# Patient Record
Sex: MALE | Race: WHITE | Employment: FULL TIME | URBAN - METROPOLITAN AREA
[De-identification: names, ages, dates, MRNs, and addresses within clinical notes are randomized per-mention and may not be internally consistent; named-entity substitution may affect disease eponyms.]

---

## 2021-05-31 ENCOUNTER — APPOINTMENT (OUTPATIENT)
Dept: GENERAL RADIOLOGY | Age: 46
End: 2021-05-31
Payer: COMMERCIAL

## 2021-05-31 ENCOUNTER — HOSPITAL ENCOUNTER (EMERGENCY)
Age: 46
Discharge: HOME | End: 2021-05-31
Attending: EMERGENCY MEDICINE
Payer: COMMERCIAL

## 2021-05-31 VITALS
SYSTOLIC BLOOD PRESSURE: 156 MMHG | OXYGEN SATURATION: 98 % | DIASTOLIC BLOOD PRESSURE: 99 MMHG | RESPIRATION RATE: 16 BRPM | HEART RATE: 85 BPM | BODY MASS INDEX: 32.91 KG/M2 | HEIGHT: 72 IN | TEMPERATURE: 97.1 F | WEIGHT: 243 LBS

## 2021-05-31 DIAGNOSIS — R07.9 CHEST PAIN, UNSPECIFIED TYPE: Primary | ICD-10-CM

## 2021-05-31 LAB
ALBUMIN SERPL-MCNC: 4.7 GM/DL (ref 3.4–5)
ALP BLD-CCNC: 50 IU/L (ref 40–129)
ALT SERPL-CCNC: 16 U/L (ref 10–40)
ANION GAP SERPL CALCULATED.3IONS-SCNC: 14 MMOL/L (ref 4–16)
AST SERPL-CCNC: 14 IU/L (ref 15–37)
BASOPHILS ABSOLUTE: 0 K/CU MM
BASOPHILS RELATIVE PERCENT: 0.4 % (ref 0–1)
BILIRUB SERPL-MCNC: 0.6 MG/DL (ref 0–1)
BUN BLDV-MCNC: 13 MG/DL (ref 6–23)
CALCIUM SERPL-MCNC: 10.3 MG/DL (ref 8.3–10.6)
CHLORIDE BLD-SCNC: 101 MMOL/L (ref 99–110)
CO2: 21 MMOL/L (ref 21–32)
CREAT SERPL-MCNC: 1 MG/DL (ref 0.9–1.3)
DIFFERENTIAL TYPE: ABNORMAL
EOSINOPHILS ABSOLUTE: 0 K/CU MM
EOSINOPHILS RELATIVE PERCENT: 0.3 % (ref 0–3)
GFR AFRICAN AMERICAN: >60 ML/MIN/1.73M2
GFR NON-AFRICAN AMERICAN: >60 ML/MIN/1.73M2
GLUCOSE BLD-MCNC: 93 MG/DL (ref 70–99)
HCT VFR BLD CALC: 48.3 % (ref 42–52)
HEMOGLOBIN: 16.8 GM/DL (ref 13.5–18)
IMMATURE NEUTROPHIL %: 0.2 % (ref 0–0.43)
LYMPHOCYTES ABSOLUTE: 2.6 K/CU MM
LYMPHOCYTES RELATIVE PERCENT: 26.3 % (ref 24–44)
MCH RBC QN AUTO: 30.2 PG (ref 27–31)
MCHC RBC AUTO-ENTMCNC: 34.8 % (ref 32–36)
MCV RBC AUTO: 86.9 FL (ref 78–100)
MONOCYTES ABSOLUTE: 0.7 K/CU MM
MONOCYTES RELATIVE PERCENT: 6.9 % (ref 0–4)
PDW BLD-RTO: 12.8 % (ref 11.7–14.9)
PLATELET # BLD: 264 K/CU MM (ref 140–440)
PMV BLD AUTO: 9.4 FL (ref 7.5–11.1)
POTASSIUM SERPL-SCNC: 3.9 MMOL/L (ref 3.5–5.1)
RBC # BLD: 5.56 M/CU MM (ref 4.6–6.2)
SEGMENTED NEUTROPHILS ABSOLUTE COUNT: 6.5 K/CU MM
SEGMENTED NEUTROPHILS RELATIVE PERCENT: 65.9 % (ref 36–66)
SODIUM BLD-SCNC: 136 MMOL/L (ref 135–145)
TOTAL IMMATURE NEUTOROPHIL: 0.02 K/CU MM
TOTAL PROTEIN: 7.8 GM/DL (ref 6.4–8.2)
TROPONIN T: <0.01 NG/ML
TROPONIN T: <0.01 NG/ML
WBC # BLD: 9.8 K/CU MM (ref 4–10.5)

## 2021-05-31 PROCEDURE — 99284 EMERGENCY DEPT VISIT MOD MDM: CPT

## 2021-05-31 PROCEDURE — 71045 X-RAY EXAM CHEST 1 VIEW: CPT

## 2021-05-31 PROCEDURE — 93005 ELECTROCARDIOGRAM TRACING: CPT | Performed by: EMERGENCY MEDICINE

## 2021-05-31 PROCEDURE — 85025 COMPLETE CBC W/AUTO DIFF WBC: CPT

## 2021-05-31 PROCEDURE — 80053 COMPREHEN METABOLIC PANEL: CPT

## 2021-05-31 PROCEDURE — 84484 ASSAY OF TROPONIN QUANT: CPT

## 2021-05-31 RX ORDER — FLUTICASONE PROPIONATE 50 MCG
1 SPRAY, SUSPENSION (ML) NASAL DAILY
COMMUNITY
End: 2021-06-02

## 2021-05-31 ASSESSMENT — PAIN DESCRIPTION - DESCRIPTORS: DESCRIPTORS: ACHING;DULL

## 2021-05-31 ASSESSMENT — PAIN DESCRIPTION - LOCATION: LOCATION: CHEST

## 2021-05-31 ASSESSMENT — PAIN DESCRIPTION - FREQUENCY: FREQUENCY: INTERMITTENT

## 2021-05-31 ASSESSMENT — PAIN SCALES - GENERAL: PAINLEVEL_OUTOF10: 2

## 2021-05-31 ASSESSMENT — HEART SCORE: ECG: 0

## 2021-05-31 NOTE — ED PROVIDER NOTES
Emergency Department Encounter  Location: [address] [place] [address]    Patient: Myrna Ulrich  MRN: [id number]  : 1975  Date of evaluation: 2021  ED Provider: Laya Enriquez MD    7PM  Myrna Ulrich was checked out to me by Dr. Mary Jane Taylor. Please see his/her initial documentation for details of the patient's initial ED presentation, physical exam and completed studies. In brief, Myrna Ulrcih is a 48 y.o. male that presented to the emergency department with chest pain. Initial evaluation is unremarkable. Heart score of 3. Awaiting delta troponin.     I have reviewed and interpreted all of the currently available lab results and diagnostics from this visit:  Results for orders placed or performed during the hospital encounter of 21   CBC Auto Differential   Result Value Ref Range    WBC 9.8 4.0 - 10.5 K/CU MM    RBC 5.56 4.6 - 6.2 M/CU MM    Hemoglobin 16.8 13.5 - 18.0 GM/DL    Hematocrit 48.3 42 - 52 %    MCV 86.9 78 - 100 FL    MCH 30.2 27 - 31 PG    MCHC 34.8 32.0 - 36.0 %    RDW 12.8 11.7 - 14.9 %    Platelets 999 849 - 388 K/CU MM    MPV 9.4 7.5 - 11.1 FL    Differential Type AUTOMATED DIFFERENTIAL     Segs Relative 65.9 36 - 66 %    Lymphocytes % 26.3 24 - 44 %    Monocytes % 6.9 (H) 0 - 4 %    Eosinophils % 0.3 0 - 3 %    Basophils % 0.4 0 - 1 %    Segs Absolute 6.5 K/CU MM    Lymphocytes Absolute 2.6 K/CU MM    Monocytes Absolute 0.7 K/CU MM    Eosinophils Absolute 0.0 K/CU MM    Basophils Absolute 0.0 K/CU MM    Immature Neutrophil % 0.2 0 - 0.43 %    Total Immature Neutrophil 0.02 K/CU MM   CMP   Result Value Ref Range    Sodium 136 135 - 145 MMOL/L    Potassium 3.9 3.5 - 5.1 MMOL/L    Chloride 101 99 - 110 mMol/L    CO2 21 21 - 32 MMOL/L    BUN 13 6 - 23 MG/DL    CREATININE 1.0 0.9 - 1.3 MG/DL    Glucose 93 70 - 99 MG/DL    Calcium 10.3 8.3 - 10.6 MG/DL    Albumin 4.7 3.4 - 5.0 GM/DL    Total Protein 7.8 6.4 - 8.2 GM/DL    Total Bilirubin 0.6 0.0 - 1.0 MG/DL    ALT 16 10 - 40 U/L    AST 14 (L) 15 - 37 IU/L    Alkaline Phosphatase 50 40 - 129 IU/L    GFR Non-African American >60 >60 mL/min/1.73m2    GFR African American >60 >60 mL/min/1.73m2    Anion Gap 14 4 - 16   Troponin   Result Value Ref Range    Troponin T <0.010 <0.01 NG/ML     XR CHEST PORTABLE    Result Date: 5/31/2021  EXAMINATION: ONE XRAY VIEW OF THE CHEST 5/31/2021 5:23 pm COMPARISON: None. HISTORY: ORDERING SYSTEM PROVIDED HISTORY: chest pain TECHNOLOGIST PROVIDED HISTORY: Reason for exam:->chest pain Reason for Exam: chest pain Acuity: Acute Type of Exam: Initial Additional signs and symptoms: chest pain since this AM FINDINGS: Cardiomediastinal silhouette is normal in size. There is no pulmonary consolidation, pleural effusion, or pneumothorax. There is linear scarring/atelectasis in the left lung base. There is no acute osseous abnormality. No acute cardiopulmonary abnormality. Final ED Course and MDM: In brief, Myrna Ulrich is a 48 y.o. male whose care was signed out to me by the outgoing provider. Delta troponin is negative. I do not suspect an emergent cause of patient's symptoms. We will discharge him home in stable condition. He is given the information for cardiologist on call in order to arrange outpatient follow-up. He was given strict return precautions. Plan of care explained to patient. Concerning signs and symptoms warranting a return visit to the Emergency Department were explained in detail. All questions and concerns were addressed to the patient's satisfaction. Patient understood and agreed with plan. I did don appropriate PPE (including N95 face mask, protective eye ware/safety glasses, gloves, hair covering, and no isolation gown), as recommended by the health facility/national standard best practice, during my bedside interactions with the patient. ED Medication Orders (From admission, onward)    None          Final Impression      1.  Chest pain, unspecified type DISPOSITION Decision To Discharge 05/31/2021 06:21:27 PM     (Please note that portions of this note may have been completed with a voice recognition program. Efforts were made to edit the dictations but occasionally words are mis-transcribed.)    Brittany Duarte MD  [id number] [address], [place]  05/31/21 1875

## 2021-05-31 NOTE — ED TRIAGE NOTES
Pt arrived via triage with c/o chest pain since 0400. Pt reports cold chills and increased bowel movements. Pt reports the pain is intermittent and moves up to the base of his neck.

## 2021-05-31 NOTE — ED NOTES
Blood collected with IV start, labeled and walked to lab for processing.       Maryana Higgins RN  05/31/21 2512

## 2021-05-31 NOTE — ED PROVIDER NOTES
Triage Chief Complaint:   Chest Pain    [place]:  Anastasiia Chaidez is a 48 y.o. male that presents with chest pain. States woke up this morning around 4 AM, then noticed chest squeezing on the left side. States it felt like a tightness. Reports that its been intermittent all day today. States he felt like the pain was radiating into the bottom of the neck as well. He states felt that the pain seems to worsen when he would get up and move around, get up and do some things like walk up the stairs. States that he felt a little bit flushed and overheated with it. He denies any leg pain or swelling, recent long travel, hospitalization, recent surgery, history of DVT. No history of cardiac testing. ROS:  General:  No fevers, no chills, no weakness  Eyes:  no vision change. ENT:  No sore throat, no nasal congestion  Cardiovascular:  + chest pain, no palpitations  Respiratory:  No shortness of breath, no cough  Gastrointestinal:  No pain, no nausea, no vomiting, no diarrhea  Musculoskeletal:  No muscle pain, no joint pain  Skin:  No rash, no pruritis, no easy bruising  Neurologic:  No speech problems, no headache, no weakness, numbness or tingling. Psychiatric:  No anxiety  Extremities:  no edema, no muscle pain    History reviewed. No pertinent past medical history. History reviewed. No pertinent surgical history. History reviewed. No pertinent family history.   Social History     Socioeconomic History    Marital status:      Spouse name: Not on file    Number of children: Not on file    Years of education: Not on file    Highest education level: Not on file   Occupational History    Not on file   Tobacco Use    Smoking status: Former Smoker    Smokeless tobacco: Former User   Substance and Sexual Activity    Alcohol use: Not Currently    Drug use: Not Currently    Sexual activity: Not on file   Other Topics Concern    Not on file   Social History Narrative    Not on file     Social Determinants of Health     Financial Resource Strain:     Difficulty of Paying Living Expenses:    Food Insecurity:     Worried About Running Out of Food in the Last Year:     [address] in the Last Year:    Transportation Needs:     Lack of Transportation (Medical):  Lack of Transportation (Non-Medical):    Physical Activity:     Days of Exercise per Week:     Minutes of Exercise per Session:    Stress:     Feeling of Stress :    Social Connections:     Frequency of Communication with Friends and Family:     Frequency of Social Gatherings with Friends and Family:     Attends Jew Services:     Active Member of Clubs or Organizations:     Attends Club or Organization Meetings:     Marital Status:    Intimate Partner Violence:     Fear of Current or Ex-Partner:     Emotionally Abused:     Physically Abused:     Sexually Abused:      No current facility-administered medications for this encounter. Current Outpatient Medications   Medication Sig Dispense Refill    Fexofenadine-Pseudoephedrine (ALLEGRA-D 12 HOUR PO) Take by mouth      fluticasone (FLONASE) 50 MCG/ACT nasal spray 1 spray by Each Nostril route daily       No Known Allergies    Nursing Notes Reviewed    Physical Exam:  ED Triage Vitals [05/31/21 1722]   Enc Vitals Group      BP (!) 156/99      Pulse 85      Resp 16      Temp 97.1 °F (36.2 °C)      Temp src       SpO2 98 %      Weight 243 lb (110.2 kg)      Height 6' (1.829 m)      Head Circumference       Peak Flow       Pain Score       Pain Loc       Pain Edu? Excl. in [address]? General appearance:  Awake, alert, in no acute distress. Skin:  Warm. Dry. Normal color, no cyanosis. Eye:  Extraocular movements intact. PERRL bilaterally. HENT: Normocephalic, atraumtic. Oral mucosa moist.  Neck:  Supple. Trachea midline. Extremity:  No edema. Normal ROM. No calf tenderness. Heart:  Regular rate and rhythm, normal S1 & S2, no extra heart sounds. Respiratory:  Lungs clear to auscultation bilaterally. Respirations nonlabored. Abdominal:  Soft. Nontender. Non distended. Neurological:  Alert and oriented times 3. No focal neuro deficits. Psychiatric:  Appropriate affect. Cooperative. I have reviewed and interpreted all of the currently available lab results from this visit (if applicable):  No results found for this visit on 05/31/21. Labs Reviewed   CBC WITH AUTO DIFFERENTIAL - Abnormal; Notable for the following components:       Result Value    Monocytes % 6.9 (*)     All other components within normal limits   TROPONIN   COMPREHENSIVE METABOLIC PANEL   TROPONIN       EKG (if obtained): (All EKG's are interpreted by myself in the absence of a cardiologist) This EKG was interpreted by me. Rate is 86, rhythm is sinus with occasional PVCs. AL and QT intervals are within normal limits. There is no ST segment or T wave changes. No previous EKG currently available for comparison. Chart review shows recent radiographs:  XR CHEST PORTABLE    Result Date: 5/31/2021  EXAMINATION: ONE XRAY VIEW OF THE CHEST 5/31/2021 5:23 pm COMPARISON: None. HISTORY: ORDERING SYSTEM PROVIDED HISTORY: chest pain TECHNOLOGIST PROVIDED HISTORY: Reason for exam:->chest pain Reason for Exam: chest pain Acuity: Acute Type of Exam: Initial Additional signs and symptoms: chest pain since this AM FINDINGS: Cardiomediastinal silhouette is normal in size. There is no pulmonary consolidation, pleural effusion, or pneumothorax. There is linear scarring/atelectasis in the left lung base. There is no acute osseous abnormality. No acute cardiopulmonary abnormality. MDM:  Patient presented with chest pain. Presentation does not appear consistent with aortic dissection or pulmonary embolus. Given history and presentation cardiac workup initiated. Patient had labs, EKG, x-ray and troponin ordered. Patient was placed on monitor.   Patient's chest x-ray is read by radiology as negative for acute abnormality, patient's first troponin is within the normal range. I have explained to the patient that our testing so far has not shown evidence of AMI. Based of their HEART score of 3, I recommended repeat troponin. I have explained to them that their risk of having an adverse cardiac event in the next 30 days, based on comparing them to other people with similar factors, is approximately 1 in 100. The patient was comfortable with this discussion and felt this was an acceptable level of risk. We also discussed the alternative options of observation with further testing or foregoing the repeat troponin. Repeat troponin is currently pending. If this is negative we will plan for discharge home with outpatient follow-up with cardiology for an outpatient stress test.  This information was provided today. We did discuss that in the meanwhile should his symptoms worsen, pain come back, stay constant, any new or worsening signs or symptoms he should return immediately for reevaluation. He voices understanding of this.     Clinical Impression:  Chest pain      (Please note that portions of this note may have been completed with a voice recognition program. Efforts were made to edit the dictations but occasionally words are mis-transcribed.)      Kenzie Loera DO  05/31/21 8009

## 2021-06-01 LAB
EKG ATRIAL RATE: 88 BPM
EKG DIAGNOSIS: NORMAL
EKG P AXIS: 72 DEGREES
EKG P-R INTERVAL: 148 MS
EKG Q-T INTERVAL: 354 MS
EKG QRS DURATION: 92 MS
EKG QTC CALCULATION (BAZETT): 423 MS
EKG R AXIS: -15 DEGREES
EKG T AXIS: 15 DEGREES
EKG VENTRICULAR RATE: 86 BPM

## 2021-06-01 PROCEDURE — 93010 ELECTROCARDIOGRAM REPORT: CPT | Performed by: INTERNAL MEDICINE

## 2021-06-02 ENCOUNTER — INITIAL CONSULT (OUTPATIENT)
Dept: CARDIOLOGY CLINIC | Age: 46
End: 2021-06-02
Payer: COMMERCIAL

## 2021-06-02 VITALS
HEART RATE: 64 BPM | BODY MASS INDEX: 33.36 KG/M2 | WEIGHT: 246 LBS | SYSTOLIC BLOOD PRESSURE: 128 MMHG | DIASTOLIC BLOOD PRESSURE: 86 MMHG

## 2021-06-02 DIAGNOSIS — E66.09 CLASS 1 OBESITY DUE TO EXCESS CALORIES WITHOUT SERIOUS COMORBIDITY WITH BODY MASS INDEX (BMI) OF 33.0 TO 33.9 IN ADULT: ICD-10-CM

## 2021-06-02 DIAGNOSIS — E78.5 DYSLIPIDEMIA: ICD-10-CM

## 2021-06-02 DIAGNOSIS — R07.9 CHEST PAIN, UNSPECIFIED TYPE: Primary | ICD-10-CM

## 2021-06-02 PROCEDURE — 99204 OFFICE O/P NEW MOD 45 MIN: CPT | Performed by: INTERNAL MEDICINE

## 2021-06-02 NOTE — PROGRESS NOTES
Ghazala Kurtz MD                                  CARDIOLOGY  NOTE      Referring Physician: No ref. provider found    Thank you for consultation. Chief Complaint:    Chief Complaint   Patient presents with    Follow-Up from Hospital    Chest Pain        HPI:     Walker Ortez is a 48y.o. year old male who recently presented to the ER with a chief complaint of chest pain      Patient presented to the ER on 5/21/2021. Patient woke up from sleep at around 4 AM with chest tightness. Chest Pain:  Retrosternal/Left side, Sharp, 7/10, non exertional in nature, non radiating  self relived. Patient was evaluated in the ER. Labs and images were personally reviewed. Chest x-ray clear  EKG shows sinus rhythm without any ischemic ST-T changes , with 1 PVC  Troponin Negative     Heart score 3. Patient was deemed low risk for ACS. Patient was advised to follow-up with cardiology as outpatient. no Smoking history    Denies any alcohol abuse  Denies any drug abuse    Family Hx of premature coronary disease with father having CABG at 54 and multiple angioplasties, grandfather also had coronary disease      Current Outpatient Medications   Medication Sig Dispense Refill    NASAL SALINE NA by Nasal route      Multiple Vitamin (MULTI VITAMIN DAILY PO) Take by mouth      Loratadine (CLARITIN PO) Take by mouth       No current facility-administered medications for this visit. Allergies:     Patient has no known allergies. Patient History:    History reviewed. No pertinent past medical history. History reviewed. No pertinent surgical history.   Family History   Problem Relation Age of Onset    Hypertension Mother     Heart Surgery Father     Heart Attack Father     Heart Surgery Paternal Grandfather      Social History     Tobacco Use    Smoking status: Former Smoker    Smokeless tobacco: Former User   Substance Use Topics    Alcohol use: Not Currently        Review of Systems: · Constitutional:  No Fever or Weight Loss   · Eyes: No Decreased Vision  · ENT: No Headaches, Hearing Loss or Vertigo  · Cardiovascular: + Chest Pain,  +/- Shortness of breath, No Palpitations. No Edema   · Respiratory: No cough or wheezing . No Respiratory distress   · Gastrointestinal: No abdominal pain, appetite loss, blood in stools, constipation, diarrhea or heartburn  · Genitourinary: No dysuria, trouble voiding, or hematuria  · Musculoskeletal:  denies any new  joint aches , or pain   · Integumentary: No rash or pruritis  · Neurological: No TIA or stroke symptoms  · Psychiatric: No anxiety or depression  · Endocrine: No malaise, fatigue or temperature intolerance  · Hematologic/Lymphatic: No bleeding problems, blood clots or swollen lymph nodes  · Allergic/Immunologic: No nasal congestion or hives        Objective:      Physical Exam:    /86   Pulse 64   Wt 246 lb (111.6 kg)   BMI 33.36 kg/m²   Wt Readings from Last 3 Encounters:   06/02/21 246 lb (111.6 kg)   05/31/21 243 lb (110.2 kg)     Body mass index is 33.36 kg/m². Vitals:    06/02/21 1537   BP: 128/86   Pulse: 64        General Appearance and Constitutional: Conversant, Well developed, Well nourished, No acute distress, Non-toxic appearance. HEENT:  Normocephalic, Atraumatic, Bilateral external ears normal, Oropharynx moist, No oral exudates,   Nose normal.   Neck- Normal range of motion, No tenderness, Supple  Eyes:  Conjunctiva normal, No discharge. Respiratory:  Normal breath sounds, No respiratory distress, No wheezing, No Rales, No Ronchi. No chest tenderness. Cardiovascular: S1-S2, no added heart sounds, No Mumurs appreciated. No gallops, rubs. No Pedal Edema   GI:  Bowel sounds normal, Soft, No tenderness,  :  No costovertebral angle tenderness   Musculoskeletal:  No gross deformities.  Back- No tenderness  Integument:  Well hydrated, no rash   Lymphatic:  No lymphadenopathy noted   Neurologic:  Alert & oriented x 3, Normal motor function, normal sensory function, no focal deficits noted   Psychiatric:  Speech and behavior appropriate       Medical decision making and Data review:    DATA:    Lab Results   Component Value Date    TROPONINT <0.010 05/31/2021     BNP:  No results found for: PROBNP  PT/INR:  No results found for: PTINR  No results found for: LABA1C  No results found for: CHOL, TRIG, HDL, LDLCALC, LDLDIRECT  Lab Results   Component Value Date    WBC 9.8 05/31/2021    HGB 16.8 05/31/2021    HCT 48.3 05/31/2021    MCV 86.9 05/31/2021     05/31/2021     TSH: No results found for: TSH  Lab Results   Component Value Date    AST 14 (L) 05/31/2021    ALT 16 05/31/2021    BILITOT 0.6 05/31/2021    ALKPHOS 50 05/31/2021         All labs, medications and tests reviewed by myself including data and history from outside source , patient and available family . 1. Chest pain, unspecified type    2. Dyslipidemia    3. Class 1 obesity due to excess calories without serious comorbidity with body mass index (BMI) of 33.0 to 33.9 in adult         Impression and Plan:    1. Atypical Chest Pain: Pt has several risk factors for CAD including strong family history of CAD   EKG NSR/no ischemic changes with 1 PVC. Order Stress MPI for Risk Stratification. Order Echocardiogram.    2. Hyperlipidemia: Check Lipid Panel    3. Obesity with BMI 33.36 . Health maintenance: Exercise and Diet counseling provided        Return in about 1 month (around 7/2/2021). Counseled extensively and medication compliance urged. We discussed that for the  prevention of ASCVD our  goal is aggressive risk modification. Patient is encouraged to exercise even a brisk walk for 30 minutes  at least 3 to 4 times a week   Various goals were discussed and questions answered. Continue current medications. Appropriate prescriptions are addressed and refills ordered. Questions answered and patient verbalizes understanding.   Call for any problems, questions, or concerns.

## 2021-06-24 ENCOUNTER — PROCEDURE VISIT (OUTPATIENT)
Dept: CARDIOLOGY CLINIC | Age: 46
End: 2021-06-24
Payer: COMMERCIAL

## 2021-06-24 ENCOUNTER — HOSPITAL ENCOUNTER (OUTPATIENT)
Age: 46
Discharge: HOME | End: 2021-06-24
Payer: COMMERCIAL

## 2021-06-24 VITALS
RESPIRATION RATE: 16 BRPM | BODY MASS INDEX: 33.32 KG/M2 | SYSTOLIC BLOOD PRESSURE: 138 MMHG | DIASTOLIC BLOOD PRESSURE: 84 MMHG | WEIGHT: 246 LBS | HEART RATE: 78 BPM | HEIGHT: 72 IN

## 2021-06-24 DIAGNOSIS — R07.9 CHEST PAIN, UNSPECIFIED TYPE: ICD-10-CM

## 2021-06-24 DIAGNOSIS — R07.9 CHEST PAIN, UNSPECIFIED TYPE: Primary | ICD-10-CM

## 2021-06-24 LAB
CHOLESTEROL, FASTING: 180 MG/DL
HDLC SERPL-MCNC: 48 MG/DL
LDL CHOLESTEROL DIRECT: 115 MG/DL
LV EF: 58 %
LVEF MODALITY: NORMAL
TRIGLYCERIDE, FASTING: 101 MG/DL

## 2021-06-24 PROCEDURE — 93306 TTE W/DOPPLER COMPLETE: CPT | Performed by: INTERNAL MEDICINE

## 2021-06-24 PROCEDURE — 80061 LIPID PANEL: CPT

## 2021-06-24 PROCEDURE — 36415 COLL VENOUS BLD VENIPUNCTURE: CPT

## 2021-06-24 PROCEDURE — 93015 CV STRESS TEST SUPVJ I&R: CPT | Performed by: INTERNAL MEDICINE

## 2021-06-25 ENCOUNTER — TELEPHONE (OUTPATIENT)
Dept: CARDIOLOGY CLINIC | Age: 46
End: 2021-06-25

## 2021-07-08 ENCOUNTER — OFFICE VISIT (OUTPATIENT)
Dept: CARDIOLOGY CLINIC | Age: 46
End: 2021-07-08
Payer: COMMERCIAL

## 2021-07-08 VITALS
HEIGHT: 71 IN | DIASTOLIC BLOOD PRESSURE: 78 MMHG | SYSTOLIC BLOOD PRESSURE: 114 MMHG | BODY MASS INDEX: 34.19 KG/M2 | HEART RATE: 61 BPM | WEIGHT: 244.2 LBS

## 2021-07-08 DIAGNOSIS — E66.09 CLASS 1 OBESITY DUE TO EXCESS CALORIES WITHOUT SERIOUS COMORBIDITY WITH BODY MASS INDEX (BMI) OF 34.0 TO 34.9 IN ADULT: ICD-10-CM

## 2021-07-08 DIAGNOSIS — R07.9 CHEST PAIN, UNSPECIFIED TYPE: Primary | ICD-10-CM

## 2021-07-08 DIAGNOSIS — E78.5 DYSLIPIDEMIA: ICD-10-CM

## 2021-07-08 PROCEDURE — 99214 OFFICE O/P EST MOD 30 MIN: CPT | Performed by: INTERNAL MEDICINE

## 2021-07-08 NOTE — PROGRESS NOTES
Gio Martínez MD                                  CARDIOLOGY  NOTE           Chief Complaint:    Chief Complaint   Patient presents with    Results      No chest pain   No dyspnea   No palpitations    EST 6/24/2021     Summary   Overall Impression:   Good exercise capacity. 10 METs work load. Physiological BP response to exercise. ETT negative for Ischemia / arrhythmia. Echocardiogram 6/24/2021     Left ventricular systolic function is normal with an ejection fraction of   55-60%. Grade I diastolic dysfunction. Mild concentric left ventricular hypertrophy. No significant valvular disease noted. Normal pulmonary artery pressure with a RVSP of 19 mmHg. No evidence of pericardial effusion. Prior HPI:     Warner Mondragon is a 48y.o. year old male who recently presented to the ER with a chief complaint of chest pain      Patient presented to the ER on 5/21/2021. Patient woke up from sleep at around 4 AM with chest tightness. Chest Pain:  Retrosternal/Left side, Sharp, 7/10, non exertional in nature, non radiating  self relived. Patient was evaluated in the ER. Labs and images were personally reviewed. Chest x-ray clear  EKG shows sinus rhythm without any ischemic ST-T changes , with 1 PVC  Troponin Negative     Heart score 3. Patient was deemed low risk for ACS. Patient was advised to follow-up with cardiology as outpatient. no Smoking history    Denies any alcohol abuse  Denies any drug abuse    Family Hx of premature coronary disease with father having CABG at 54 and multiple angioplasties, grandfather also had coronary disease      Current Outpatient Medications   Medication Sig Dispense Refill    NASAL SALINE NA by Nasal route      Multiple Vitamin (MULTI VITAMIN DAILY PO) Take by mouth      Loratadine (CLARITIN PO) Take by mouth       No current facility-administered medications for this visit. Allergies:     Patient has no known allergies.     Patient History:    Past Medical History:   Diagnosis Date    Class 1 obesity with body mass index (BMI) of 33.0 to 33.9 in adult     H/O chest pain      History reviewed. No pertinent surgical history. Family History   Problem Relation Age of Onset    Hypertension Mother     Heart Surgery Father     Heart Attack Father     Heart Surgery Paternal Grandfather      Social History     Tobacco Use    Smoking status: Former Smoker    Smokeless tobacco: Former User   Substance Use Topics    Alcohol use: Not Currently     Comment: caffine 4 diet pops a day          Review of Systems:     · Constitutional:  No Fever or Weight Loss   · Eyes: No Decreased Vision  · ENT: No Headaches, Hearing Loss or Vertigo  · Cardiovascular:0 Chest Pain,  0 Shortness of breath, No Palpitations. No Edema   · Respiratory: No cough or wheezing . No Respiratory distress   · Gastrointestinal: No abdominal pain, appetite loss, blood in stools, constipation, diarrhea or heartburn  · Genitourinary: No dysuria, trouble voiding, or hematuria  · Musculoskeletal:  denies any new  joint aches , or pain   · Integumentary: No rash or pruritis  · Neurological: No TIA or stroke symptoms  · Psychiatric: No anxiety or depression  · Endocrine: No malaise, fatigue or temperature intolerance  · Hematologic/Lymphatic: No bleeding problems, blood clots or swollen lymph nodes  · Allergic/Immunologic: No nasal congestion or hives        Objective:      Physical Exam:    /78   Pulse 61   Ht 5' 11\" (1.803 m)   Wt 244 lb 3.2 oz (110.8 kg)   BMI 34.06 kg/m²   Wt Readings from Last 3 Encounters:   07/08/21 244 lb 3.2 oz (110.8 kg)   06/24/21 246 lb (111.6 kg)   06/02/21 246 lb (111.6 kg)     Body mass index is 34.06 kg/m². Vitals:    07/08/21 1440   BP: 114/78   Pulse: 61        General Appearance and Constitutional: Conversant, Well developed, Well nourished, No acute distress, Non-toxic appearance.    HEENT:  Normocephalic, Atraumatic, Bilateral external ears normal, Oropharynx moist, No oral exudates,   Nose normal.   Neck- Normal range of motion, No tenderness, Supple  Eyes:  Conjunctiva normal, No discharge. Respiratory:  Normal breath sounds, No respiratory distress, No wheezing, No Rales, No Ronchi. No chest tenderness. Cardiovascular: S1-S2, no added heart sounds, No Mumurs appreciated. No gallops, rubs. No Pedal Edema   GI:  Bowel sounds normal, Soft, No tenderness,  :  No costovertebral angle tenderness   Musculoskeletal:  No gross deformities. Back- No tenderness  Integument:  Well hydrated, no rash   Lymphatic:  No lymphadenopathy noted   Neurologic:  Alert & oriented x 3, Normal motor function, normal sensory function, no focal deficits noted   Psychiatric:  Speech and behavior appropriate       Medical decision making and Data review:    DATA:    Lab Results   Component Value Date    TROPONINT <0.010 05/31/2021     BNP:  No results found for: PROBNP  PT/INR:  No results found for: PTINR  No results found for: LABA1C  Lab Results   Component Value Date    HDL 48 06/24/2021    LDLDIRECT 115 (H) 06/24/2021     Lab Results   Component Value Date    WBC 9.8 05/31/2021    HGB 16.8 05/31/2021    HCT 48.3 05/31/2021    MCV 86.9 05/31/2021     05/31/2021     TSH: No results found for: TSH  Lab Results   Component Value Date    AST 14 (L) 05/31/2021    ALT 16 05/31/2021    BILITOT 0.6 05/31/2021    ALKPHOS 50 05/31/2021         All labs, medications and tests reviewed by myself including data and history from outside source , patient and available family . 1. Chest pain, unspecified type    2. Dyslipidemia    3. Class 1 obesity due to excess calories without serious comorbidity with body mass index (BMI) of 34.0 to 34.9 in adult         Impression and Plan:    1. Atypical Chest Pain: Pt has several risk factors for CAD including strong family history of CAD     EKG NSR/no ischemic changes with 1 PVC.   Stress MPI for Risk Stratification. : Good

## 2021-07-08 NOTE — PATIENT INSTRUCTIONS
Please be informed that if you contact our office outside of normal business hours the physician on call cannot help with any scheduling or rescheduling issues, procedure instruction questions or any type of medication issue. We advise you for any urgent/emergency that you go to the nearest emergency room! PLEASE CALL OUR OFFICE DURING NORMAL BUSINESS HOURS    Monday - Friday   8 am to 5 pm    [place]: [phone]    [place]:  [phone]    **It is YOUR responsibilty to bring medication bottles and/or updated medication list to [address] [place] [address].  This will allow us to better serve you and all your healthcare needs**

## 2022-01-05 ENCOUNTER — HOSPITAL ENCOUNTER (EMERGENCY)
Age: 47
Discharge: HOME | End: 2022-01-05
Attending: EMERGENCY MEDICINE
Payer: COMMERCIAL

## 2022-01-05 ENCOUNTER — APPOINTMENT (OUTPATIENT)
Dept: GENERAL RADIOLOGY | Age: 47
End: 2022-01-05
Payer: COMMERCIAL

## 2022-01-05 VITALS
HEART RATE: 73 BPM | WEIGHT: 223.9 LBS | BODY MASS INDEX: 31.35 KG/M2 | TEMPERATURE: 97.9 F | RESPIRATION RATE: 18 BRPM | OXYGEN SATURATION: 96 % | SYSTOLIC BLOOD PRESSURE: 146 MMHG | DIASTOLIC BLOOD PRESSURE: 109 MMHG | HEIGHT: 71 IN

## 2022-01-05 DIAGNOSIS — R07.9 CHEST PAIN, UNSPECIFIED TYPE: Primary | ICD-10-CM

## 2022-01-05 LAB
ALBUMIN SERPL-MCNC: 4.3 GM/DL (ref 3.4–5)
ALP BLD-CCNC: 47 IU/L (ref 40–129)
ALT SERPL-CCNC: 19 U/L (ref 10–40)
ANION GAP SERPL CALCULATED.3IONS-SCNC: 13 MMOL/L (ref 4–16)
AST SERPL-CCNC: 14 IU/L (ref 15–37)
BASOPHILS ABSOLUTE: 0 K/CU MM
BASOPHILS RELATIVE PERCENT: 0.4 % (ref 0–1)
BILIRUB SERPL-MCNC: 0.4 MG/DL (ref 0–1)
BILIRUBIN DIRECT: 0.2 MG/DL (ref 0–0.3)
BILIRUBIN, INDIRECT: 0.2 MG/DL (ref 0–0.7)
BUN BLDV-MCNC: 11 MG/DL (ref 6–23)
CALCIUM SERPL-MCNC: 8.9 MG/DL (ref 8.3–10.6)
CHLORIDE BLD-SCNC: 103 MMOL/L (ref 99–110)
CO2: 21 MMOL/L (ref 21–32)
CREAT SERPL-MCNC: 0.8 MG/DL (ref 0.9–1.3)
DIFFERENTIAL TYPE: ABNORMAL
EOSINOPHILS ABSOLUTE: 0.1 K/CU MM
EOSINOPHILS RELATIVE PERCENT: 1.2 % (ref 0–3)
GFR AFRICAN AMERICAN: >60 ML/MIN/1.73M2
GFR NON-AFRICAN AMERICAN: >60 ML/MIN/1.73M2
GLUCOSE BLD-MCNC: 91 MG/DL (ref 70–99)
HCT VFR BLD CALC: 47.1 % (ref 42–52)
HEMOGLOBIN: 16.3 GM/DL (ref 13.5–18)
IMMATURE NEUTROPHIL %: 0.2 % (ref 0–0.43)
LIPASE: 26 IU/L (ref 13–60)
LYMPHOCYTES ABSOLUTE: 2.9 K/CU MM
LYMPHOCYTES RELATIVE PERCENT: 28.8 % (ref 24–44)
MCH RBC QN AUTO: 30.1 PG (ref 27–31)
MCHC RBC AUTO-ENTMCNC: 34.6 % (ref 32–36)
MCV RBC AUTO: 87.1 FL (ref 78–100)
MONOCYTES ABSOLUTE: 0.9 K/CU MM
MONOCYTES RELATIVE PERCENT: 8.3 % (ref 0–4)
PDW BLD-RTO: 12.3 % (ref 11.7–14.9)
PLATELET # BLD: 277 K/CU MM (ref 140–440)
PMV BLD AUTO: 9.2 FL (ref 7.5–11.1)
POTASSIUM SERPL-SCNC: 3.6 MMOL/L (ref 3.5–5.1)
PRO-BNP: 6.18 PG/ML
RBC # BLD: 5.41 M/CU MM (ref 4.6–6.2)
SEGMENTED NEUTROPHILS ABSOLUTE COUNT: 6.2 K/CU MM
SEGMENTED NEUTROPHILS RELATIVE PERCENT: 61.1 % (ref 36–66)
SODIUM BLD-SCNC: 137 MMOL/L (ref 135–145)
TOTAL IMMATURE NEUTOROPHIL: 0.02 K/CU MM
TOTAL PROTEIN: 7.3 GM/DL (ref 6.4–8.2)
TROPONIN T: <0.01 NG/ML
TROPONIN T: <0.01 NG/ML
WBC # BLD: 10.2 K/CU MM (ref 4–10.5)

## 2022-01-05 PROCEDURE — 93005 ELECTROCARDIOGRAM TRACING: CPT | Performed by: EMERGENCY MEDICINE

## 2022-01-05 PROCEDURE — 99285 EMERGENCY DEPT VISIT HI MDM: CPT

## 2022-01-05 PROCEDURE — 83690 ASSAY OF LIPASE: CPT

## 2022-01-05 PROCEDURE — 85025 COMPLETE CBC W/AUTO DIFF WBC: CPT

## 2022-01-05 PROCEDURE — 83880 ASSAY OF NATRIURETIC PEPTIDE: CPT

## 2022-01-05 PROCEDURE — 84484 ASSAY OF TROPONIN QUANT: CPT

## 2022-01-05 PROCEDURE — 80053 COMPREHEN METABOLIC PANEL: CPT

## 2022-01-05 PROCEDURE — 82248 BILIRUBIN DIRECT: CPT

## 2022-01-05 PROCEDURE — 71045 X-RAY EXAM CHEST 1 VIEW: CPT

## 2022-01-05 ASSESSMENT — PAIN SCALES - GENERAL: PAINLEVEL_OUTOF10: 2

## 2022-01-06 LAB
EKG ATRIAL RATE: 65 BPM
EKG DIAGNOSIS: NORMAL
EKG P AXIS: 13 DEGREES
EKG P-R INTERVAL: 146 MS
EKG Q-T INTERVAL: 400 MS
EKG QRS DURATION: 92 MS
EKG QTC CALCULATION (BAZETT): 416 MS
EKG R AXIS: -10 DEGREES
EKG T AXIS: 9 DEGREES
EKG VENTRICULAR RATE: 65 BPM

## 2022-01-06 PROCEDURE — 93010 ELECTROCARDIOGRAM REPORT: CPT | Performed by: INTERNAL MEDICINE

## 2022-01-06 NOTE — ED PROVIDER NOTES
CHIEF COMPLAINT    Chief Complaint   Patient presents with    Chest Pain    Shortness of Breath    Arm Pain     HPI  Marie Diaz is a 50 y.o. male who presents to the ED with complaints of chest pain and shortness of breath. Patient states around 6 PM this evening he had some pain along the anterior chest with radiation to the left arm. He states that during this time he felt short of breath when laying down. This lasted for approximately an hour and resolved spontaneously. When present he had pressure sensation rating 4/10. Nothing seems to make the pain particularly better or worse. He experienced a similar episode in May 2021 and had echo cardiogram and cardiac stress test performed following the May episode in June 2021 which were reassuring. Patient denies fevers, chills, dizziness, lightheadedness, nausea, vomiting, history of DVT/PE, recent travel, surgery, personal history of cancer, hemoptysis. REVIEW OF SYSTEMS  Constitutional: No fever, chills or recent illness. Eye: No visual changes  HENT: No earache or sore throat. Resp: No  productive cough. Cardio: No palpitations. Complains of chest pain  GI: No abdominal pain, nausea, vomiting, constipation or diarrhea. No melena. : No dysuria, urgency or frequency. Endocrine: No heat intolerance, no cold intolerance, no polydipsia   Lymphatics: No adenopathy  Musculoskeletal: No new muscle aches or joint pain. Neuro: No headaches. Psych: No homicidal or suicidal thoughts  Skin: No rash, No itching. ?  ?   PAST MEDICAL HISTORY  Past Medical History:   Diagnosis Date    Class 1 obesity with body mass index (BMI) of 33.0 to 33.9 in adult     H/O chest pain      FAMILY HISTORY  Family History   Problem Relation Age of Onset    Hypertension Mother     Heart Surgery Father     Heart Attack Father     Heart Surgery Paternal Grandfather      SOCIAL HISTORY  Social History     Socioeconomic History    Marital status:      Spouse name: Not on file    Number of children: Not on file    Years of education: Not on file    Highest education level: Not on file   Occupational History    Not on file   Tobacco Use    Smoking status: Former Smoker    Smokeless tobacco: Former User   Vaping Use    Vaping Use: Never used   Substance and Sexual Activity    Alcohol use: Not Currently     Comment: caffine 4 diet pops a day      Drug use: Not Currently    Sexual activity: Yes     Partners: Female   Other Topics Concern    Not on file   Social History Narrative    Not on file     Social Determinants of Health     Financial Resource Strain:     Difficulty of Paying Living Expenses: Not on file   Food Insecurity:     Worried About [address] in the Last Year: Not on file    [address] [employer] in the Last Year: Not on file   Transportation Needs:     Lack of Transportation (Medical): Not on file    Lack of Transportation (Non-Medical): Not on file   Physical Activity:     Days of Exercise per Week: Not on file    Minutes of Exercise per Session: Not on file   Stress:     Feeling of Stress : Not on file   Social Connections:     Frequency of Communication with Friends and Family: Not on file    Frequency of Social Gatherings with Friends and Family: Not on file    Attends [employer]ism Services: Not on file    Active Member of [address] [place] [address] [employer] or Organizations: Not on file    Attends Club or Organization Meetings: Not on file    Marital Status: Not on file   Intimate Partner Violence:     Fear of Current or Ex-Partner: Not on file    Emotionally Abused: Not on file    Physically Abused: Not on file    Sexually Abused: Not on file   Housing Stability:     Unable to Pay for Housing in the Last Year: Not on file    Number of [place] in the Last Year: Not on file    Unstable Housing in the Last Year: Not on file       SURGICAL HISTORY  No past surgical history on file.   CURRENT MEDICATIONS  Discharge Medication List as of 1/5/2022 11:51 PM      CONTINUE these medications which have NOT CHANGED    Details   Multiple Vitamin (MULTI VITAMIN DAILY PO) Take by mouthHistorical Med      NASAL SALINE NA by Nasal routeHistorical Med      Loratadine (CLARITIN PO) Take by mouthHistorical Med           ALLERGIES  No Known Allergies    Nursing notes reviewed by myself for past medical history, family history, social history, surgical history, current medications, and allergies. PHYSICAL EXAM  VITAL SIGNS: Triage VS:    ED Triage Vitals [01/05/22 2024]   Enc Vitals Group      BP (!) 164/115      Pulse 73      Resp 18      Temp 97.9 °F (36.6 °C)      Temp Source Oral      SpO2 95 %      Weight 223 lb 14.4 oz (101.6 kg)      Height 5' 11\" (1.803 m)      Head Circumference       Peak Flow       Pain Score       Pain Loc       Pain Edu? Excl. in [address]? Constitutional: Well developed, Well nourished, nontoxic-appearing  HENT: Normocephalic, Atraumatic, Bilateral external ears normal, Oropharynx moist, No oral exudates, Nose normal.   Eyes: PERRL, EOMI, Conjunctiva normal, No discharge. No scleral icterus. Neck: Normal range of motion, No tenderness, Supple. Lymphatic: No lymphadenopathy noted. Cardiovascular: Normal heart rate, Normal rhythm, No murmurs, gallops or rubs. Thorax & Lungs: Normal breath sounds, No respiratory distress, No wheezing. Abdomen: Soft, No tenderness, No masses, No pulsatile masses, No distention, Normal bowel sounds  Skin: Warm, Dry, Pink, No mottling, No erythema, No rash. Back: No tenderness, No CVA tenderness. Extremities: No edema, No tenderness, No cyanosis, Normal perfusion, No clubbing. Musculoskeletal: Good range of motion in all major joints as observed. No major deformities noted. Neurologic: Alert & oriented x 3, No focal deficits noted. Psychiatric: Affect normal, Judgment normal, Mood normal.   EKG  Per my interpretation demonstrates normal sinus rhythm at a rate of 65 bpm.  Normal axis.   Normal intervals. Isolated T wave inversion in lead III. No acute ST segment changes. Appears similar compared to previous EKG  RADIOLOGY  Labs Reviewed   CBC WITH AUTO DIFFERENTIAL - Abnormal; Notable for the following components:       Result Value    Monocytes % 8.3 (*)     All other components within normal limits   BASIC METABOLIC PANEL - Abnormal; Notable for the following components:    CREATININE 0.8 (*)     All other components within normal limits   HEPATIC FUNCTION PANEL - Abnormal; Notable for the following components:    AST 14 (*)     All other components within normal limits   TROPONIN   LIPASE   BRAIN NATRIURETIC PEPTIDE   TROPONIN     I personally reviewed the images. The radiologist's interpretation reveals:  Last Imaging results   XR CHEST PORTABLE   Final Result   1. Linear opacity at the right lung base favored to reflect atelectasis. 2. Elevation of the right hemidiaphragm. MEDS GIVEN IN ED:  Medications - No data to display  [address] [facility]  40-year-old male presents emergency department complaints of chest pain and some shortness of breath that started this evening around 6 PM but have resolved upon arrival here. Lungs clear to auscultation bilaterally. His initial EKG shows isolated T wave inversion in lead III although this appears to be similar to previous EKG for the patient. Initial troponin level is not elevated. He has a low risk Wells score and is PERC negative and therefore I doubt pulmonary embolism. His BNP is not elevated and chest x-ray shows some evidence of linear atelectasis but is otherwise reassuring. A repeat troponin was obtained 2 hours after the first and remains not elevated and the patient remains chest pain-free. At this time I feel he is appropriate for discharge home. Instructed to follow-up with his cardiologist as needed and actually is an appoint with his primary care provider in 2 weeks.   Return precautions provided. Amount and/or Complexity of Data Reviewed  Clinical lab tests: reviewed  Decide to obtain previous medical records or to obtain history from someone other than the patient: yes       -  Patient seen and evaluated in the emergency department. -  Triage and nursing notes reviewed and incorporated. -  Old chart records reviewed and incorporated. -  Work-up included:  See above  -  Results discussed with patient. Appropriate PPE utilized as indicated for entire patient encounter? Time of Disposition: See timeline  ? Discharge Medication List as of 1/5/2022 11:51 PM        FINAL IMPRESSION  1. Chest pain, unspecified type        Electronically signed by:  [address] Rony, DO, 1/6/2022         [address] Rony, DO  01/06/22 [id number]

## 2022-01-06 NOTE — ED TRIAGE NOTES
Pt c/o of pain in right arm which radiates to shoulder and back. Pt also states he has pain in his chest and when he lays down he becomes SOB. Pt reports he had pain in his left arm today as well.  A&O x4, even and unlabored respers at this time, pt talking in complete sentences, p/w/d

## 2022-01-19 ENCOUNTER — APPOINTMENT (RX ONLY)
Dept: URBAN - METROPOLITAN AREA CLINIC 377 | Facility: CLINIC | Age: 47
Setting detail: DERMATOLOGY
End: 2022-01-19

## 2022-01-19 DIAGNOSIS — D485 NEOPLASM OF UNCERTAIN BEHAVIOR OF SKIN: ICD-10-CM | Status: INADEQUATELY CONTROLLED

## 2022-01-19 PROBLEM — D48.5 NEOPLASM OF UNCERTAIN BEHAVIOR OF SKIN: Status: ACTIVE | Noted: 2022-01-19

## 2022-01-19 PROCEDURE — 69100 BIOPSY OF EXTERNAL EAR: CPT

## 2022-01-19 PROCEDURE — ? BIOPSY BY SHAVE METHOD

## 2022-01-19 PROCEDURE — ? ADDITIONAL NOTES

## 2022-01-19 PROCEDURE — ? COUNSELING

## 2022-01-19 ASSESSMENT — LOCATION DETAILED DESCRIPTION DERM: LOCATION DETAILED: LEFT POSTERIOR EAR

## 2022-01-19 ASSESSMENT — LOCATION SIMPLE DESCRIPTION DERM: LOCATION SIMPLE: LEFT EAR

## 2022-01-19 ASSESSMENT — LOCATION ZONE DERM: LOCATION ZONE: EAR

## 2022-01-19 NOTE — HPI: EVALUATION OF SKIN LESION(S)
What Type Of Note Output Would You Prefer (Optional)?: Bullet Format
Hpi Title: Evaluation of a Skin Lesion
How Severe Are Your Spot(S)?: mild
Have Your Spot(S) Been Treated In The Past?: has not been treated
Additional History: New spot on left ear for the past ear.

## 2022-01-19 NOTE — PROCEDURE: BIOPSY BY SHAVE METHOD
Detail Level: Detailed
Depth Of Biopsy: dermis
Was A Bandage Applied: Yes
Size Of Lesion In Cm: 1.2
X Size Of Lesion In Cm: 0
Biopsy Type: H and E
Biopsy Method: Dermablade
Anesthesia Type: 1% lidocaine with epinephrine
Anesthesia Volume In Cc (Will Not Render If 0): 0.5
Hemostasis: Drysol
Wound Care: Petrolatum
Dressing: bandage
Destruction After The Procedure: No
Type Of Destruction Used: Curettage
Curettage Text: The wound bed was treated with curettage after the biopsy was performed.
Cryotherapy Text: The wound bed was treated with cryotherapy after the biopsy was performed.
Electrodesiccation Text: The wound bed was treated with electrodesiccation after the biopsy was performed.
Electrodesiccation And Curettage Text: The wound bed was treated with electrodesiccation and curettage after the biopsy was performed.
Silver Nitrate Text: The wound bed was treated with silver nitrate after the biopsy was performed.
Lab: 6
Consent: Written consent was obtained and risks were reviewed including but not limited to scarring, infection, bleeding, scabbing, incomplete removal, nerve damage and allergy to anesthesia.
Post-Care Instructions: I reviewed with the patient in detail post-care instructions. Patient is to keep the biopsy site dry overnight, and then apply bacitracin twice daily until healed. Patient may apply hydrogen peroxide soaks to remove any crusting.
Notification Instructions: Patient will be notified of biopsy results. However, patient instructed to call the office if not contacted within 2 weeks.
Billing Type: Third-Party Bill
Information: Selecting Yes will display possible errors in your note based on the variables you have selected. This validation is only offered as a suggestion for you. PLEASE NOTE THAT THE VALIDATION TEXT WILL BE REMOVED WHEN YOU FINALIZE YOUR NOTE. IF YOU WANT TO FAX A PRELIMINARY NOTE YOU WILL NEED TO TOGGLE THIS TO 'NO' IF YOU DO NOT WANT IT IN YOUR FAXED NOTE.

## 2022-03-08 ENCOUNTER — OFFICE VISIT (OUTPATIENT)
Dept: CARDIOLOGY CLINIC | Age: 47
End: 2022-03-08
Payer: COMMERCIAL

## 2022-03-08 VITALS
BODY MASS INDEX: 34.02 KG/M2 | WEIGHT: 243 LBS | SYSTOLIC BLOOD PRESSURE: 126 MMHG | HEART RATE: 78 BPM | OXYGEN SATURATION: 97 % | HEIGHT: 71 IN | DIASTOLIC BLOOD PRESSURE: 78 MMHG

## 2022-03-08 DIAGNOSIS — E78.5 DYSLIPIDEMIA: ICD-10-CM

## 2022-03-08 DIAGNOSIS — R07.9 CHEST PAIN, UNSPECIFIED TYPE: Primary | ICD-10-CM

## 2022-03-08 DIAGNOSIS — E66.09 CLASS 1 OBESITY DUE TO EXCESS CALORIES WITHOUT SERIOUS COMORBIDITY WITH BODY MASS INDEX (BMI) OF 33.0 TO 33.9 IN ADULT: ICD-10-CM

## 2022-03-08 PROCEDURE — 99214 OFFICE O/P EST MOD 30 MIN: CPT | Performed by: INTERNAL MEDICINE

## 2022-03-08 RX ORDER — ASPIRIN 81 MG/1
81 TABLET ORAL DAILY
COMMUNITY

## 2022-03-08 NOTE — PROGRESS NOTES
Dawna Farah MD                                  CARDIOLOGY  NOTE           Chief Complaint:    Chief Complaint   Patient presents with    Follow-Up from Hospital     Pt states he has SOB on exertion. No swelling and No surgeries or procedures scheduled. Patient went to the hospital with chief complaint of chest pain  And shortness of breath. After preliminary work-up he was discharged home to follow-up with cardiology        EST 6/24/2021     Summary   Overall Impression:   Good exercise capacity. 10 METs work load. Physiological BP response to exercise. ETT negative for Ischemia / arrhythmia. Echocardiogram 6/24/2021     Left ventricular systolic function is normal with an ejection fraction of   55-60%. Grade I diastolic dysfunction. Mild concentric left ventricular hypertrophy. No significant valvular disease noted. Normal pulmonary artery pressure with a RVSP of 19 mmHg. No evidence of pericardial effusion. Prior HPI:     Andrew Espinoza is a 50y.o. year old male who recently presented to the ER with a chief complaint of chest pain      Patient presented to the ER on 5/21/2021. Patient woke up from sleep at around 4 AM with chest tightness. Chest Pain:  Retrosternal/Left side, Sharp, 7/10, non exertional in nature, non radiating  self relived. Patient was evaluated in the ER. Labs and images were personally reviewed. Chest x-ray clear  EKG shows sinus rhythm without any ischemic ST-T changes , with 1 PVC  Troponin Negative     Heart score 3. Patient was deemed low risk for ACS. Patient was advised to follow-up with cardiology as outpatient.     no Smoking history    Denies any alcohol abuse  Denies any drug abuse    Family Hx of premature coronary disease with father having CABG at 54 and multiple angioplasties, grandfather also had coronary disease      Current Outpatient Medications   Medication Sig Dispense Refill    aspirin 81 MG EC tablet Take 81 mg by mouth daily      NASAL SALINE NA by Nasal route      Multiple Vitamin (MULTI VITAMIN DAILY PO) Take by mouth      Loratadine (CLARITIN PO) Take 10 mg by mouth        No current facility-administered medications for this visit. Allergies:     Patient has no known allergies. Patient History:    Past Medical History:   Diagnosis Date    Class 1 obesity with body mass index (BMI) of 33.0 to 33.9 in adult     H/O chest pain      No past surgical history on file. Family History   Problem Relation Age of Onset    Hypertension Mother     Heart Surgery Father     Heart Attack Father     Heart Surgery Paternal Grandfather      Social History     Tobacco Use    Smoking status: Former Smoker    Smokeless tobacco: Former User   Substance Use Topics    Alcohol use: Not Currently     Comment: caffine 4 diet pops a day          Review of Systems:     · Constitutional:  No Fever or Weight Loss   · Eyes: No Decreased Vision  · ENT: No Headaches, Hearing Loss or Vertigo  · Cardiovascular:0 Chest Pain,  0 Shortness of breath, No Palpitations. No Edema   · Respiratory: No cough or wheezing .  No Respiratory distress   · Gastrointestinal: No abdominal pain, appetite loss, blood in stools, constipation, diarrhea or heartburn  · Genitourinary: No dysuria, trouble voiding, or hematuria  · Musculoskeletal:  denies any new  joint aches , or pain   · Integumentary: No rash or pruritis  · Neurological: No TIA or stroke symptoms  · Psychiatric: No anxiety or depression  · Endocrine: No malaise, fatigue or temperature intolerance  · Hematologic/Lymphatic: No bleeding problems, blood clots or swollen lymph nodes  · Allergic/Immunologic: No nasal congestion or hives        Objective:      Physical Exam:    /78   Pulse 78   Ht 5' 11\" (1.803 m)   Wt 243 lb (110.2 kg)   SpO2 97%   BMI 33.89 kg/m²   Wt Readings from Last 3 Encounters:   03/08/22 243 lb (110.2 kg)   01/05/22 223 lb 14.4 oz (101.6 kg)   07/08/21 244 lb 3.2 oz (110.8 kg)     Body mass index is 33.89 kg/m². Vitals:    03/08/22 1040   BP: 126/78   Pulse: 78   SpO2: 97%        General Appearance and Constitutional: Conversant, Well developed, Well nourished, No acute distress, Non-toxic appearance. HEENT:  Normocephalic, Atraumatic, Bilateral external ears normal, Oropharynx moist, No oral exudates,   Nose normal.   Neck- Normal range of motion, No tenderness, Supple  Eyes:  Conjunctiva normal, No discharge. Respiratory:  Normal breath sounds, No respiratory distress, No wheezing, No Rales, No Ronchi. No chest tenderness. Cardiovascular: S1-S2, no added heart sounds, No Mumurs appreciated. No gallops, rubs. No Pedal Edema   GI:  Bowel sounds normal, Soft, No tenderness,  :  No costovertebral angle tenderness   Musculoskeletal:  No gross deformities. Back- No tenderness  Integument:  Well hydrated, no rash   Lymphatic:  No lymphadenopathy noted   Neurologic:  Alert & oriented x 3, Normal motor function, normal sensory function, no focal deficits noted   Psychiatric:  Speech and behavior appropriate       Medical decision making and Data review:    DATA:    Lab Results   Component Value Date    TROPONINT <0.010 01/05/2022     BNP:    Lab Results   Component Value Date    PROBNP 6.18 01/05/2022     PT/INR:  No results found for: PTINR  No results found for: LABA1C  Lab Results   Component Value Date    HDL 48 06/24/2021    LDLDIRECT 115 (H) 06/24/2021     Lab Results   Component Value Date    WBC 10.2 01/05/2022    HGB 16.3 01/05/2022    HCT 47.1 01/05/2022    MCV 87.1 01/05/2022     01/05/2022     TSH: No results found for: TSH  Lab Results   Component Value Date    AST 14 (L) 01/05/2022    ALT 19 01/05/2022    BILIDIR 0.2 01/05/2022    BILITOT 0.4 01/05/2022    ALKPHOS 47 01/05/2022         All labs, medications and tests reviewed by myself including data and history from outside source , patient and available family .       1. Chest pain, unspecified type 2. Dyslipidemia    3. Class 1 obesity due to excess calories without serious comorbidity with body mass index (BMI) of 33.0 to 33.9 in adult         Impression and Plan:    1. Atypical Chest Pain:     Patient has recurrent chest pains resulting in ER visit in January 2022  Retrosternal chest pain nonexertional radiating to left arm. Pt has several risk factors for CAD including strong family history of CAD     Obtain CTA coronary angiogram to further delineate coronary anatomy      Previously patient had basic exercise stress test in June 2021 which was negative for ischemia with good functional capacity    In the hospital: Troponin negative  EKG showed sinus rhythm with no ischemic changes, incomplete right bundle branch block  Echocardiogram.  Normal Echocardiogram     2. Hyperlipidemia:  Total cholesterol 180. . HDL 48. Advise low-fat diet and exercise    3. Obesity with BMI 33.36 . Health maintenance: Exercise and Diet counseling provided        Return in about 6 weeks (around 4/19/2022). Counseled extensively and medication compliance urged. We discussed that for the  prevention of ASCVD our  goal is aggressive risk modification. Patient is encouraged to exercise even a brisk walk for 30 minutes  at least 3 to 4 times a week   Various goals were discussed and questions answered. Continue current medications. Appropriate prescriptions are addressed and refills ordered. Questions answered and patient verbalizes understanding. Call for any problems, questions, or concerns.

## 2022-04-13 ENCOUNTER — HOSPITAL ENCOUNTER (OUTPATIENT)
Dept: CT IMAGING | Age: 47
Discharge: HOME | End: 2022-04-13
Payer: COMMERCIAL

## 2022-04-13 DIAGNOSIS — R07.9 CHEST PAIN, UNSPECIFIED TYPE: ICD-10-CM

## 2022-04-13 PROCEDURE — 75574 CT ANGIO HRT W/3D IMAGE: CPT | Performed by: INTERNAL MEDICINE

## 2022-04-13 PROCEDURE — 75574 CT ANGIO HRT W/3D IMAGE: CPT

## 2022-04-13 PROCEDURE — 6360000004 HC RX CONTRAST MEDICATION: Performed by: INTERNAL MEDICINE

## 2022-04-13 RX ADMIN — IOPAMIDOL 120 ML: 755 INJECTION, SOLUTION INTRAVENOUS at 10:52

## 2022-04-13 NOTE — PROCEDURES
CARDIAC FINDINGS:  Cardiac CT scanning was performed, CT angiography during contrast administration using a 64. Scanner using a slice thickness of 0.5 mm and Gantry rotation time of 600 ms. Scanning was performed using 110 K  using auto adjustment of mA  Adjustment achieved low dose, retrospective gating was used  Contrast Type: isovue 370   Contrast used: 1cc/lb Max 100 cc   Dose length Product use: .36   Scan length: Excellent    Left Main Coronary artery:  Left main originates normally from the left coronary sinus and gives rise to the left anterior descending artery and left circumflex artery. It is free of any significant atherosclerotic disease. Left anterior descending artery:  Left anterior descending artery is a normal caliber vessel that wraps around the apex and gives off large diagonal branches. The diagonal branches are free of any significant atherosclerotic disease. There is  calcified plaque noted in proximal to mid segment . There is possible soft plaque in between calcified plaques of about 20 to 30%. High-grade stenosis is not suspected can also be a stitch artifact. Left circumflex artery:  Left circumflex artery is a moderate size vessel that gives off a marginal branch. The left circumflex artery is free of any significant atherosclerotic disease. The circumflex bifurcates into OM branches it is widely patent    Right coronary artery:  Right coronary artery originates from the right coronary sinus. It seems to be the dominant vessel and gives rise to the posterior descending artery and posterior ventricular artery. The RCA is moderate size vessel. Posterior descending artery and posterior lateral branches are free of any significant disease. Pericardium:  Pericardium is normal in appearance without any thickening, calcification or pericardial fluid. Cardiac structures: The estimated ejection fraction is: 48 %.  The ascending aorta is measured at 29. 1mm. Interpretation summary:  There is no significant coronary artery disease identified. Mid to proximal LAD has calcified plaque which is nonobstructive. There is a soft plaque in between calcified lesions which is probably nonflow limiting 30 to 40% can also be a stitch artifact  Circumflex and right coronary artery are widely patent  Estimated ejection fraction is 48%  Myocardium does not show any evidence of infarct or thickening. NON-CARDIAC FINDINGS - []      Please note that only cardiac images and windows were evaluated by cardiology. Images reviewed for interpretation for cardiac structures and coronary artery disease only.   Any non cardiac assessment  require separate radiology report

## 2022-04-14 ENCOUNTER — OFFICE VISIT (OUTPATIENT)
Dept: CARDIOLOGY CLINIC | Age: 47
End: 2022-04-14
Payer: COMMERCIAL

## 2022-04-14 VITALS
DIASTOLIC BLOOD PRESSURE: 76 MMHG | BODY MASS INDEX: 33.18 KG/M2 | HEIGHT: 71 IN | WEIGHT: 237 LBS | HEART RATE: 68 BPM | SYSTOLIC BLOOD PRESSURE: 114 MMHG

## 2022-04-14 DIAGNOSIS — R07.9 CHEST PAIN, UNSPECIFIED TYPE: Primary | ICD-10-CM

## 2022-04-14 DIAGNOSIS — I25.10 CAD IN NATIVE ARTERY: ICD-10-CM

## 2022-04-14 DIAGNOSIS — I10 ESSENTIAL HYPERTENSION: ICD-10-CM

## 2022-04-14 PROCEDURE — 99214 OFFICE O/P EST MOD 30 MIN: CPT | Performed by: INTERNAL MEDICINE

## 2022-04-14 RX ORDER — NITROGLYCERIN 0.4 MG/1
0.4 TABLET SUBLINGUAL EVERY 5 MIN PRN
Qty: 25 TABLET | Refills: 3 | Status: SHIPPED | OUTPATIENT
Start: 2022-04-14

## 2022-04-14 RX ORDER — ATORVASTATIN CALCIUM 20 MG/1
20 TABLET, FILM COATED ORAL DAILY
Qty: 90 TABLET | Refills: 1 | Status: SHIPPED | OUTPATIENT
Start: 2022-04-14 | End: 2022-10-14

## 2022-04-14 NOTE — PROGRESS NOTES
Kimberly Preston MD                                  CARDIOLOGY  NOTE           Chief Complaint:    Chief Complaint   Patient presents with    Follow-up     CTA done 4/13/2022, Pt denies any new cardiac sx, no surgeries or procedures scheduled that he is aware of    Results      Patient underwent CTA coronary angiogram yesterday. Denies any further episodes of chest pain dyspnea or palpitations. CTA coronary angiogram 4/13/2022    Interpretation summary:  There is no significant coronary artery disease identified. Mid to proximal LAD has calcified plaque which is nonobstructive. There is a soft plaque in between calcified lesions which is probably nonflow limiting 30 to 40% can also be a stitch artifact  Circumflex and right coronary artery are widely patent  Estimated ejection fraction is 48%  Myocardium does not show any evidence of infarct or thickening. EST 6/24/2021     Summary   Overall Impression:   Good exercise capacity. 10 METs work load. Physiological BP response to exercise. ETT negative for Ischemia / arrhythmia. Echocardiogram 6/24/2021     Left ventricular systolic function is normal with an ejection fraction of   55-60%. Grade I diastolic dysfunction. Mild concentric left ventricular hypertrophy. No significant valvular disease noted. Normal pulmonary artery pressure with a RVSP of 19 mmHg. No evidence of pericardial effusion. Prior HPI:     Wolf Chavez is a 50y.o. year old male who recently presented to the ER with a chief complaint of chest pain      Patient presented to the ER on 5/21/2021. Patient woke up from sleep at around 4 AM with chest tightness. Chest Pain:  Retrosternal/Left side, Sharp, 7/10, non exertional in nature, non radiating  self relived. Patient was evaluated in the ER. Labs and images were personally reviewed.     Chest x-ray clear  EKG shows sinus rhythm without any ischemic ST-T changes , with 1 PVC  Troponin Negative     Heart score 3. Patient was deemed low risk for ACS. Patient was advised to follow-up with cardiology as outpatient. no Smoking history    Denies any alcohol abuse  Denies any drug abuse    Family Hx of premature coronary disease with father having CABG at 54 and multiple angioplasties, grandfather also had coronary disease      Current Outpatient Medications   Medication Sig Dispense Refill    atorvastatin (LIPITOR) 20 MG tablet Take 1 tablet by mouth daily 90 tablet 1    nitroGLYCERIN (NITROSTAT) 0.4 MG SL tablet Place 1 tablet under the tongue every 5 minutes as needed for Chest pain 25 tablet 3    aspirin 81 MG EC tablet Take 81 mg by mouth daily      NASAL SALINE NA by Nasal route      Multiple Vitamin (MULTI VITAMIN DAILY PO) Take by mouth      Loratadine (CLARITIN PO) Take 10 mg by mouth        No current facility-administered medications for this visit. Allergies:     Patient has no known allergies. Patient History:    Past Medical History:   Diagnosis Date    Class 1 obesity with body mass index (BMI) of 33.0 to 33.9 in adult     H/O chest pain      History reviewed. No pertinent surgical history. Family History   Problem Relation Age of Onset    Hypertension Mother     Heart Surgery Father     Heart Attack Father     Heart Surgery Paternal Grandfather      Social History     Tobacco Use    Smoking status: Former Smoker    Smokeless tobacco: Former User   Substance Use Topics    Alcohol use: Not Currently     Comment: caffine 4 diet pops a day          Review of Systems:     · Constitutional:  No Fever or Weight Loss   · Eyes: No Decreased Vision  · ENT: No Headaches, Hearing Loss or Vertigo  · Cardiovascular:0 Chest Pain,  0 Shortness of breath, No Palpitations. No Edema   · Respiratory: No cough or wheezing .  No Respiratory distress   · Gastrointestinal: No abdominal pain, appetite loss, blood in stools, constipation, diarrhea or heartburn  · Genitourinary: No dysuria, trouble voiding, or hematuria  · Musculoskeletal:  denies any new  joint aches , or pain   · Integumentary: No rash or pruritis  · Neurological: No TIA or stroke symptoms  · Psychiatric: No anxiety or depression  · Endocrine: No malaise, fatigue or temperature intolerance  · Hematologic/Lymphatic: No bleeding problems, blood clots or swollen lymph nodes  · Allergic/Immunologic: No nasal congestion or hives        Objective:      Physical Exam:    /76   Pulse 68   Ht 5' 11\" (1.803 m)   Wt 237 lb (107.5 kg)   BMI 33.05 kg/m²   Wt Readings from Last 3 Encounters:   04/14/22 237 lb (107.5 kg)   03/08/22 243 lb (110.2 kg)   01/05/22 223 lb 14.4 oz (101.6 kg)     Body mass index is 33.05 kg/m². Vitals:    04/14/22 0940   BP: 114/76   Pulse: 68        General Appearance and Constitutional: Conversant, Well developed, Well nourished, No acute distress, Non-toxic appearance. HEENT:  Normocephalic, Atraumatic, Bilateral external ears normal, Oropharynx moist, No oral exudates,   Nose normal.   Neck- Normal range of motion, No tenderness, Supple  Eyes:  Conjunctiva normal, No discharge. Respiratory:  Normal breath sounds, No respiratory distress, No wheezing, No Rales, No Ronchi. No chest tenderness. Cardiovascular: S1-S2, no added heart sounds, No Mumurs appreciated. No gallops, rubs. No Pedal Edema   GI:  Bowel sounds normal, Soft, No tenderness,  :  No costovertebral angle tenderness   Musculoskeletal:  No gross deformities.  Back- No tenderness  Integument:  Well hydrated, no rash   Lymphatic:  No lymphadenopathy noted   Neurologic:  Alert & oriented x 3, Normal motor function, normal sensory function, no focal deficits noted   Psychiatric:  Speech and behavior appropriate       Medical decision making and Data review:    DATA:    Lab Results   Component Value Date    TROPONINT <0.010 01/05/2022     BNP:    Lab Results   Component Value Date    PROBNP 6.18 01/05/2022     PT/INR:  No results found for: PTINR  No results found for: LABA1C  Lab Results   Component Value Date    HDL 48 06/24/2021    LDLDIRECT 115 (H) 06/24/2021     Lab Results   Component Value Date    WBC 10.2 01/05/2022    HGB 16.3 01/05/2022    HCT 47.1 01/05/2022    MCV 87.1 01/05/2022     01/05/2022     TSH: No results found for: TSH  Lab Results   Component Value Date    AST 14 (L) 01/05/2022    ALT 19 01/05/2022    BILIDIR 0.2 01/05/2022    BILITOT 0.4 01/05/2022    ALKPHOS 47 01/05/2022         All labs, medications and tests reviewed by myself including data and history from outside source , patient and available family . 1. Chest pain, unspecified type    2. CAD in native artery    3. Essential hypertension         Impression and Plan:    1. Atypical Chest Pain / Resolved   2. Nonobstructive coronary artery disease    Patient has recurrent chest pains resulting in ER visit in January 2022  Retrosternal chest pain nonexertional radiating to left arm. Patient had basic exercise stress test in June 2021 which was negative for ischemia with good functional capacity    In the hospital: Troponin negative  EKG showed sinus rhythm with no ischemic changes, incomplete right bundle branch block  Echocardiogram.  Normal Echocardiogram     CTA angiogram 4/13/2022  Nonobstructive CAD 30 to 40% mid LAD stenosis, nonlimiting flow. Circumflex and RCA widely patent  Estimated EF 48%. Continue with medical therapy  Start patient on low-dose statins. Start SL Nitro PRN   Continue aspirin      3. Hyperlipidemia:  Total cholesterol 180. . HDL 48. Advise low-fat diet and exercise. Started on low-dose statin    4. Essential hypertension: Borderline blood pressure at home 130/80 average. Currently not on medication. BP in the office today 114/76. Advised lifestyle modifications. Low-salt diet exercise weight loss. 5. Obesity with BMI 33.36 .  Health maintenance: Exercise and Diet counseling provided        Return in about 6 months (around 10/14/2022). Counseled extensively and medication compliance urged. We discussed that for the  prevention of ASCVD our  goal is aggressive risk modification. Patient is encouraged to exercise even a brisk walk for 30 minutes  at least 3 to 4 times a week   Various goals were discussed and questions answered. Continue current medications. Appropriate prescriptions are addressed and refills ordered. Questions answered and patient verbalizes understanding. Call for any problems, questions, or concerns.

## 2022-04-14 NOTE — PATIENT INSTRUCTIONS
Please be informed that if you contact our office outside of normal business hours the physician on call cannot help with any scheduling or rescheduling issues, procedure instruction questions or any type of medication issue. We advise you for any urgent/emergency that you go to the nearest emergency room! PLEASE CALL OUR OFFICE DURING NORMAL BUSINESS HOURS    Monday - Friday   8 am to 5 pm    [facility] [address]: [phone]    Post:  [phone]  **It is YOUR responsibilty to bring medication bottles and/or updated medication list to [address] [place] [address]. This will allow us to better serve you and all your healthcare needs**  [facility] Locations - No appointment necessary. Sites open Monday to Friday. Call your preferred location for test preparation, business hours and other information you need. [employer]. [place] [facility] [address] [place], [address] [facility]  Phone: [phone] [facility] [address] [facility]  [address].   [place], [address]  Phone: [phone]

## 2022-06-29 ENCOUNTER — HOSPITAL ENCOUNTER (OUTPATIENT)
Dept: CT IMAGING | Age: 47
Discharge: HOME | End: 2022-06-29
Payer: COMMERCIAL

## 2022-06-29 DIAGNOSIS — R06.89 DYSPNEA AND RESPIRATORY ABNORMALITY: ICD-10-CM

## 2022-06-29 DIAGNOSIS — R06.00 DYSPNEA AND RESPIRATORY ABNORMALITY: ICD-10-CM

## 2022-06-29 DIAGNOSIS — R05.9 COUGH: ICD-10-CM

## 2022-06-29 DIAGNOSIS — R94.2 NONSPECIFIC ABNORMAL RESULTS OF PULMONARY SYSTEM FUNCTION STUDY: ICD-10-CM

## 2022-06-29 DIAGNOSIS — R91.8 LUNG MASS: ICD-10-CM

## 2022-06-29 PROCEDURE — 71250 CT THORAX DX C-: CPT

## 2022-10-14 RX ORDER — ATORVASTATIN CALCIUM 20 MG/1
20 TABLET, FILM COATED ORAL DAILY
Qty: 90 TABLET | Refills: 1 | Status: SHIPPED | OUTPATIENT
Start: 2022-10-14

## 2022-11-23 ENCOUNTER — OFFICE VISIT (OUTPATIENT)
Dept: CARDIOLOGY CLINIC | Age: 47
End: 2022-11-23
Payer: COMMERCIAL

## 2022-11-23 VITALS
DIASTOLIC BLOOD PRESSURE: 78 MMHG | HEIGHT: 71 IN | BODY MASS INDEX: 26.04 KG/M2 | HEART RATE: 73 BPM | SYSTOLIC BLOOD PRESSURE: 120 MMHG | WEIGHT: 186 LBS

## 2022-11-23 DIAGNOSIS — R07.9 CHEST PAIN, UNSPECIFIED TYPE: Primary | ICD-10-CM

## 2022-11-23 DIAGNOSIS — E78.5 DYSLIPIDEMIA: ICD-10-CM

## 2022-11-23 DIAGNOSIS — I10 ESSENTIAL HYPERTENSION: ICD-10-CM

## 2022-11-23 PROCEDURE — 99214 OFFICE O/P EST MOD 30 MIN: CPT | Performed by: INTERNAL MEDICINE

## 2022-11-23 PROCEDURE — 3074F SYST BP LT 130 MM HG: CPT | Performed by: INTERNAL MEDICINE

## 2022-11-23 PROCEDURE — 3078F DIAST BP <80 MM HG: CPT | Performed by: INTERNAL MEDICINE

## 2022-11-23 RX ORDER — CHOLECALCIFEROL (VITAMIN D3) 125 MCG
5000 CAPSULE ORAL
COMMUNITY

## 2022-11-23 RX ORDER — BUDESONIDE, GLYCOPYRROLATE, AND FORMOTEROL FUMARATE 160; 9; 4.8 UG/1; UG/1; UG/1
2 AEROSOL, METERED RESPIRATORY (INHALATION) 2 TIMES DAILY
COMMUNITY
Start: 2022-05-26

## 2022-11-23 NOTE — PROGRESS NOTES
Jose Roth MD                                  CARDIOLOGY  NOTE           Chief Complaint:    Chief Complaint   Patient presents with    6 Month Follow-Up     Pt denies any cardiac sx. Pt smoke 1-2 cigars and drinks 1-2 a week. Pt does not have any procedures or surgeries schedules. Follow-up  Denies any chest pain shortness of breath or palpitations      CTA coronary angiogram 4/13/2022    Interpretation summary:  There is no significant coronary artery disease identified. Mid to proximal LAD has calcified plaque which is nonobstructive. There is a soft plaque in between calcified lesions which is probably nonflow limiting 30 to 40% can also be a stitch artifact  Circumflex and right coronary artery are widely patent  Estimated ejection fraction is 48%  Myocardium does not show any evidence of infarct or thickening. EST 6/24/2021     Summary   Overall Impression:   Good exercise capacity. 10 METs work load. Physiological BP response to exercise. ETT negative for Ischemia / arrhythmia. Echocardiogram 6/24/2021     Left ventricular systolic function is normal with an ejection fraction of   55-60%. Grade I diastolic dysfunction. Mild concentric left ventricular hypertrophy. No significant valvular disease noted. Normal pulmonary artery pressure with a RVSP of 19 mmHg. No evidence of pericardial effusion. Prior HPI:     Frank Weathers is a 50y.o. year old male who recently presented to the ER with a chief complaint of chest pain      Patient presented to the ER on 5/21/2021. Patient woke up from sleep at around 4 AM with chest tightness. Chest Pain:  Retrosternal/Left side, Sharp, 7/10, non exertional in nature, non radiating  self relived. Patient was evaluated in the ER. Labs and images were personally reviewed. Chest x-ray clear  EKG shows sinus rhythm without any ischemic ST-T changes , with 1 PVC  Troponin Negative     Heart score 3.     Patient was deemed low risk for ACS. Patient was advised to follow-up with cardiology as outpatient. no Smoking history    Denies any alcohol abuse  Denies any drug abuse    Family Hx of premature coronary disease with father having CABG at 54 and multiple angioplasties, grandfather also had coronary disease      Current Outpatient Medications   Medication Sig Dispense Refill    Cholecalciferol (VITAMIN D) 125 MCG (5000 UT) CAPS Take 5,000 mcg by mouth Daily with lunch      Budeson-Glycopyrrol-Formoterol (BREZTRI AEROSPHERE) 160-9-4.8 MCG/ACT AERO Take 2 puffs by mouth 2 times daily      Omega-3 Fatty Acids (FISH OIL BURP-LESS PO) Take 2 mg by mouth Daily with lunch      GLUCOSAMINE-CHONDROITIN PO Take 2 mg by mouth daily      atorvastatin (LIPITOR) 20 MG tablet Take 1 tablet by mouth daily 90 tablet 1    nitroGLYCERIN (NITROSTAT) 0.4 MG SL tablet Place 1 tablet under the tongue every 5 minutes as needed for Chest pain 25 tablet 3    aspirin 81 MG EC tablet Take 81 mg by mouth daily      Multiple Vitamin (MULTI VITAMIN DAILY PO) Take by mouth      Loratadine (CLARITIN PO) Take 10 mg by mouth       NASAL SALINE NA by Nasal route (Patient not taking: Reported on 11/23/2022)       No current facility-administered medications for this visit. Allergies:     Patient has no known allergies. Patient History:    Past Medical History:   Diagnosis Date    Class 1 obesity with body mass index (BMI) of 33.0 to 33.9 in adult     H/O chest pain      No past surgical history on file.   Family History   Problem Relation Age of Onset    Hypertension Mother     Heart Surgery Father     Heart Attack Father     Heart Surgery Paternal Grandfather      Social History     Tobacco Use    Smoking status: Former    Smokeless tobacco: Former   Substance Use Topics    Alcohol use: Not Currently     Comment: caffine 4 diet pops a day          Review of Systems:     Constitutional:  No Fever or Weight Loss   Eyes: No Decreased Vision  ENT: No Headaches, Hearing Loss or Vertigo  Cardiovascular:0 Chest Pain,  0 Shortness of breath, No Palpitations. No Edema   Respiratory: No cough or wheezing . No Respiratory distress   Gastrointestinal: No abdominal pain, appetite loss, blood in stools, constipation, diarrhea or heartburn  Genitourinary: No dysuria, trouble voiding, or hematuria  Musculoskeletal:  denies any new  joint aches , or pain   Integumentary: No rash or pruritis  Neurological: No TIA or stroke symptoms  Psychiatric: No anxiety or depression  Endocrine: No malaise, fatigue or temperature intolerance  Hematologic/Lymphatic: No bleeding problems, blood clots or swollen lymph nodes  Allergic/Immunologic: No nasal congestion or hives        Objective:      Physical Exam:    /78 (Site: Left Upper Arm, Position: Sitting, Cuff Size: Medium Adult)   Pulse 73   Ht 5' 11\" (1.803 m)   Wt 186 lb (84.4 kg)   BMI 25.94 kg/m²   Wt Readings from Last 3 Encounters:   11/23/22 186 lb (84.4 kg)   04/14/22 237 lb (107.5 kg)   03/08/22 243 lb (110.2 kg)     Body mass index is 25.94 kg/m². Vitals:    11/23/22 0921   BP: 120/78   Pulse: 73        General Appearance and Constitutional: Conversant, Well developed, Well nourished, No acute distress, Non-toxic appearance. HEENT:  Normocephalic, Atraumatic, Bilateral external ears normal, Oropharynx moist, No oral exudates,   Nose normal.   Neck- Normal range of motion, No tenderness, Supple  Eyes:  Conjunctiva normal, No discharge. Respiratory:  Normal breath sounds, No respiratory distress, No wheezing, No Rales, No Ronchi. No chest tenderness. Cardiovascular: S1-S2, no added heart sounds, No Mumurs appreciated. No gallops, rubs. No Pedal Edema   GI:  Bowel sounds normal, Soft, No tenderness,  :  No costovertebral angle tenderness   Musculoskeletal:  No gross deformities.  Back- No tenderness  Integument:  Well hydrated, no rash   Lymphatic:  No lymphadenopathy noted   Neurologic:  Alert & oriented x 3, Normal motor function, normal sensory function, no focal deficits noted   Psychiatric:  Speech and behavior appropriate       Medical decision making and Data review:    DATA:    Lab Results   Component Value Date    TROPONINT <0.010 01/05/2022     BNP:    Lab Results   Component Value Date    PROBNP 6.18 01/05/2022     PT/INR:  No results found for: PTINR  No results found for: LABA1C  Lab Results   Component Value Date    HDL 48 06/24/2021    LDLDIRECT 115 (H) 06/24/2021     Lab Results   Component Value Date    WBC 10.2 01/05/2022    HGB 16.3 01/05/2022    HCT 47.1 01/05/2022    MCV 87.1 01/05/2022     01/05/2022     TSH: No results found for: TSH  Lab Results   Component Value Date    AST 14 (L) 01/05/2022    ALT 19 01/05/2022    BILIDIR 0.2 01/05/2022    BILITOT 0.4 01/05/2022    ALKPHOS 47 01/05/2022         All labs, medications and tests reviewed by myself including data and history from outside source , patient and available family . 1. Chest pain, unspecified type    2. Dyslipidemia    3. Essential hypertension           Impression and Plan:    Atypical Chest Pain / Resolved   Nonobstructive coronary artery disease    Patient has recurrent chest pains resulting in ER visit in January 2022  Retrosternal chest pain nonexertional radiating to left arm. Patient had basic exercise stress test in June 2021 which was negative for ischemia with good functional capacity    Echocardiogram.  Normal Echocardiogram     CTA angiogram 4/13/2022  Nonobstructive CAD 30 to 40% mid LAD stenosis, nonlimiting flow. Circumflex and RCA widely patent  Estimated EF 48%. Continue with medical therapy  low-dose statins. Continue  SL Nitro PRN -did not use since last visit  Continue aspirin      Patient has picked up exercise. Currently burns 800 900 rylee/day on cardio/elliptical.  Has lost 50 pounds since last visit -BMI is close to 25  Patient is also on a restricted diet 1200 rylee/day.   Mostly green vegetables and lean meat.  Drinks about 1 gallon of fluids per day          Hyperlipidemia: Excellent control. HDL 62. LDL 49. Total cholesterol 141. Triglycerides 48. Continue with low-dose statins and exercise as tolerated    Essential hypertension: Currently not on medication. BP in the office today 114/76. Advised lifestyle modifications. Low-salt diet exercise weight loss. Obesity with BMI 25.94 - Good weight loss since last visit . Health maintenance: Exercise and Diet counseling provided        Return in about 1 year (around 11/23/2023). Counseled extensively and medication compliance urged. We discussed that for the  prevention of ASCVD our  goal is aggressive risk modification. Patient is encouraged to exercise even a brisk walk for 30 minutes  at least 3 to 4 times a week   Various goals were discussed and questions answered. Continue current medications. Appropriate prescriptions are addressed and refills ordered. Questions answered and patient verbalizes understanding. Call for any problems, questions, or concerns.

## 2023-05-04 RX ORDER — ATORVASTATIN CALCIUM 20 MG/1
20 TABLET, FILM COATED ORAL DAILY
Qty: 90 TABLET | Refills: 1 | Status: SHIPPED | OUTPATIENT
Start: 2023-05-04

## 2023-11-01 RX ORDER — ATORVASTATIN CALCIUM 20 MG/1
20 TABLET, FILM COATED ORAL DAILY
Qty: 90 TABLET | Refills: 3 | Status: SHIPPED | OUTPATIENT
Start: 2023-11-01

## 2023-12-06 ENCOUNTER — OFFICE VISIT (OUTPATIENT)
Dept: CARDIOLOGY CLINIC | Age: 48
End: 2023-12-06
Payer: COMMERCIAL

## 2023-12-06 VITALS
HEIGHT: 72 IN | SYSTOLIC BLOOD PRESSURE: 122 MMHG | DIASTOLIC BLOOD PRESSURE: 74 MMHG | BODY MASS INDEX: 30.61 KG/M2 | WEIGHT: 226 LBS | HEART RATE: 67 BPM

## 2023-12-06 DIAGNOSIS — I25.10 CAD IN NATIVE ARTERY: Primary | ICD-10-CM

## 2023-12-06 DIAGNOSIS — E78.5 DYSLIPIDEMIA: ICD-10-CM

## 2023-12-06 DIAGNOSIS — I10 ESSENTIAL HYPERTENSION: ICD-10-CM

## 2023-12-06 DIAGNOSIS — E66.09 CLASS 1 OBESITY DUE TO EXCESS CALORIES WITH SERIOUS COMORBIDITY AND BODY MASS INDEX (BMI) OF 30.0 TO 30.9 IN ADULT: ICD-10-CM

## 2023-12-06 PROCEDURE — 3078F DIAST BP <80 MM HG: CPT | Performed by: INTERNAL MEDICINE

## 2023-12-06 PROCEDURE — 3074F SYST BP LT 130 MM HG: CPT | Performed by: INTERNAL MEDICINE

## 2023-12-06 PROCEDURE — 93000 ELECTROCARDIOGRAM COMPLETE: CPT | Performed by: INTERNAL MEDICINE

## 2023-12-06 PROCEDURE — 99213 OFFICE O/P EST LOW 20 MIN: CPT | Performed by: INTERNAL MEDICINE

## 2023-12-06 RX ORDER — CETIRIZINE HYDROCHLORIDE 10 MG/1
10 TABLET ORAL DAILY
COMMUNITY

## 2023-12-06 NOTE — PROGRESS NOTES
Maurice Winkler MD                                  CARDIOLOGY  NOTE           Chief Complaint:    Chief Complaint   Patient presents with    1 Year Follow Up     CP is burning/pressure started months ago last seconds no surgeries or procedures scheduled that he is aware of and no refills needed    Shortness of Breath        Follow-up  Mild intermittent CP   Denies any shortness of breath or palpitations      CTA coronary angiogram 4/13/2022    Interpretation summary:  There is no significant coronary artery disease identified. Mid to proximal LAD has calcified plaque which is nonobstructive. There is a soft plaque in between calcified lesions which is probably nonflow limiting 30 to 40% can also be a stitch artifact  Circumflex and right coronary artery are widely patent  Estimated ejection fraction is 48%  Myocardium does not show any evidence of infarct or thickening. EST 6/24/2021     Summary   Overall Impression:   Good exercise capacity. 10 METs work load. Physiological BP response to exercise. ETT negative for Ischemia / arrhythmia. Echocardiogram 6/24/2021     Left ventricular systolic function is normal with an ejection fraction of   55-60%. Grade I diastolic dysfunction. Mild concentric left ventricular hypertrophy. No significant valvular disease noted. Normal pulmonary artery pressure with a RVSP of 19 mmHg. No evidence of pericardial effusion. Prior HPI:     Vlad Rodríguez is a 46y.o. year old male who recently presented to the ER with a chief complaint of chest pain      Patient presented to the ER on 5/21/2021. Patient woke up from sleep at around 4 AM with chest tightness. Chest Pain:  Retrosternal/Left side, Sharp, 7/10, non exertional in nature, non radiating  self relived. Patient was evaluated in the ER. Labs and images were personally reviewed.     Chest x-ray clear  EKG shows sinus rhythm without any ischemic ST-T changes , with 1 PVC  Troponin Negative

## 2023-12-06 NOTE — PATIENT INSTRUCTIONS
We are committed to providing you the best care possible. If you receive a survey after visiting one of our offices, please take time to share your experience concerning your physician office visit. These surveys are confidential and no health information about you is shared. We are eager to improve for you and we are counting on your feedback to help make that happen. **It is YOUR responsibilty to bring medication bottles and/or updated medication list to [facility]. This will allow us to better serve you and all your healthcare needs**  Please be informed that if you contact our office outside of normal business hours the physician on call cannot help with any scheduling or rescheduling issues, procedure instruction questions or any type of medication issue. We advise you for any urgent/emergency that you go to the nearest emergency room!     PLEASE CALL OUR OFFICE DURING NORMAL BUSINESS HOURS    Monday - Friday   8 am to 5 pm    [place]: [address]: [phone]    [place]:  [phone]

## 2024-10-23 LAB
CHOLESTEROL, TOTAL: 142 MG/DL
CHOLESTEROL/HDL RATIO: NORMAL
HDLC SERPL-MCNC: 62 MG/DL (ref 35–70)
LDL CHOLESTEROL: 55
NONHDLC SERPL-MCNC: NORMAL MG/DL
TRIGL SERPL-MCNC: 126 MG/DL
VLDLC SERPL CALC-MCNC: 25 MG/DL

## 2024-11-01 RX ORDER — ATORVASTATIN CALCIUM 20 MG/1
20 TABLET, FILM COATED ORAL DAILY
Qty: 90 TABLET | Refills: 3 | Status: SHIPPED | OUTPATIENT
Start: 2024-11-01

## 2024-12-11 ENCOUNTER — OFFICE VISIT (OUTPATIENT)
Dept: CARDIOLOGY CLINIC | Age: 49
End: 2024-12-11
Payer: COMMERCIAL

## 2024-12-11 VITALS
HEART RATE: 51 BPM | DIASTOLIC BLOOD PRESSURE: 74 MMHG | HEIGHT: 72 IN | BODY MASS INDEX: 33.32 KG/M2 | SYSTOLIC BLOOD PRESSURE: 126 MMHG | WEIGHT: 246 LBS

## 2024-12-11 DIAGNOSIS — E78.5 DYSLIPIDEMIA: ICD-10-CM

## 2024-12-11 DIAGNOSIS — I25.10 CAD IN NATIVE ARTERY: Primary | ICD-10-CM

## 2024-12-11 DIAGNOSIS — I10 ESSENTIAL HYPERTENSION: ICD-10-CM

## 2024-12-11 PROCEDURE — 3078F DIAST BP <80 MM HG: CPT | Performed by: INTERNAL MEDICINE

## 2024-12-11 PROCEDURE — 3074F SYST BP LT 130 MM HG: CPT | Performed by: INTERNAL MEDICINE

## 2024-12-11 PROCEDURE — 99214 OFFICE O/P EST MOD 30 MIN: CPT | Performed by: INTERNAL MEDICINE

## 2024-12-11 PROCEDURE — 93000 ELECTROCARDIOGRAM COMPLETE: CPT | Performed by: INTERNAL MEDICINE

## 2024-12-11 RX ORDER — NITROGLYCERIN 0.4 MG/1
0.4 TABLET SUBLINGUAL EVERY 5 MIN PRN
Qty: 25 TABLET | Refills: 3 | Status: SHIPPED | OUTPATIENT
Start: 2024-12-11

## 2024-12-11 NOTE — PROGRESS NOTES
Charli Randall MD                                  CARDIOLOGY  NOTE           Chief Complaint:    Chief Complaint   Patient presents with    1 Year Follow Up     Pt denies any cardiac sx no surgeries or procedures scheduled         Follow-up  Mild intermittent CP   Patient father passed away last December from cardiovascular complications after ischemic bowel and sepsis in [place].  Denies any shortness of breath or palpitations      CTA coronary angiogram 4/13/2022    Interpretation summary:  There is no significant coronary artery disease identified.   Mid to proximal LAD has calcified plaque which is nonobstructive.  There is a soft plaque in between calcified lesions which is probably nonflow limiting 30 to 40% can also be a stitch artifact  Circumflex and right coronary artery are widely patent  Estimated ejection fraction is 48%  Myocardium does not show any evidence of infarct or thickening.      EST 6/24/2021     Summary   Overall Impression:   Good exercise capacity. 10 METs work load.   Physiological BP response to exercise.   ETT negative for Ischemia / arrhythmia.      Echocardiogram 6/24/2021     Left ventricular systolic function is normal with an ejection fraction of   55-60%.   Grade I diastolic dysfunction.   Mild concentric left ventricular hypertrophy.   No significant valvular disease noted.   Normal pulmonary artery pressure with a RVSP of 19 mmHg.   No evidence of pericardial effusion.      Prior HPI:     Mathew is a 49 y.o. year old male who recently presented to the ER with a chief complaint of chest pain      Patient presented to the ER on 5/21/2021.  Patient woke up from sleep at around 4 AM with chest tightness.  Chest Pain:  Retrosternal/Left side, Sharp, 7/10, non exertional in nature, non radiating  self relived.     Patient was evaluated in the ER.  Labs and images were personally reviewed.    Chest x-ray clear  EKG shows sinus rhythm without any ischemic ST-T changes , with